# Patient Record
Sex: MALE | Race: OTHER | HISPANIC OR LATINO | Employment: UNEMPLOYED | URBAN - METROPOLITAN AREA
[De-identification: names, ages, dates, MRNs, and addresses within clinical notes are randomized per-mention and may not be internally consistent; named-entity substitution may affect disease eponyms.]

---
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
## 2023-09-29 ENCOUNTER — APPOINTMENT (EMERGENCY)
Dept: RADIOLOGY | Facility: HOSPITAL | Age: 18
End: 2023-09-29

## 2023-09-29 ENCOUNTER — HOSPITAL ENCOUNTER (EMERGENCY)
Facility: HOSPITAL | Age: 18
Discharge: HOME | End: 2023-09-29
Attending: INTERNAL MEDICINE

## 2023-09-29 VITALS
SYSTOLIC BLOOD PRESSURE: 154 MMHG | TEMPERATURE: 98.6 F | WEIGHT: 166.45 LBS | HEART RATE: 75 BPM | DIASTOLIC BLOOD PRESSURE: 83 MMHG | RESPIRATION RATE: 16 BRPM | OXYGEN SATURATION: 100 %

## 2023-09-29 DIAGNOSIS — M25.532 LEFT WRIST PAIN: Primary | ICD-10-CM

## 2023-09-29 PROCEDURE — 99284 EMERGENCY DEPT VISIT MOD MDM: CPT | Performed by: INTERNAL MEDICINE

## 2023-09-29 PROCEDURE — 99283 EMERGENCY DEPT VISIT LOW MDM: CPT

## 2023-09-29 PROCEDURE — 73110 X-RAY EXAM OF WRIST: CPT

## 2023-09-29 RX ORDER — IBUPROFEN 400 MG/1
600 TABLET ORAL EVERY 6 HOURS PRN
Qty: 30 TABLET | Refills: 0 | Status: SHIPPED | OUTPATIENT
Start: 2023-09-29

## 2023-09-29 RX ORDER — IBUPROFEN 600 MG/1
600 TABLET ORAL ONCE
Status: COMPLETED | OUTPATIENT
Start: 2023-09-29 | End: 2023-09-29

## 2023-09-29 RX ORDER — ACETAMINOPHEN 325 MG/1
975 TABLET ORAL ONCE
Status: COMPLETED | OUTPATIENT
Start: 2023-09-29 | End: 2023-09-29

## 2023-09-29 RX ORDER — IBUPROFEN 400 MG/1
600 TABLET ORAL EVERY 6 HOURS PRN
Qty: 30 TABLET | Refills: 0 | Status: SHIPPED | OUTPATIENT
Start: 2023-09-29 | End: 2023-09-29 | Stop reason: SDUPTHER

## 2023-09-29 RX ORDER — SENNOSIDES 8.6 MG
650 CAPSULE ORAL EVERY 8 HOURS PRN
Qty: 30 TABLET | Refills: 0 | Status: SHIPPED | OUTPATIENT
Start: 2023-09-29 | End: 2023-09-29 | Stop reason: SDUPTHER

## 2023-09-29 RX ORDER — SENNOSIDES 8.6 MG
650 CAPSULE ORAL EVERY 8 HOURS PRN
Qty: 30 TABLET | Refills: 0 | Status: SHIPPED | OUTPATIENT
Start: 2023-09-29

## 2023-09-29 RX ADMIN — ACETAMINOPHEN 975 MG: 325 TABLET ORAL at 19:34

## 2023-09-29 RX ADMIN — IBUPROFEN 600 MG: 600 TABLET ORAL at 19:34

## 2023-09-29 NOTE — Clinical Note
Maliha Torres was seen and treated in our emergency department on 9/29/2023. Diagnosis:     [facility]  . He may return on this date: If you have any questions or concerns, please don't hesitate to call.       Cristi Harrington MD    ______________________________           _______________          _______________  [facility] ALEXANDRE [facility] Representative                              Date                                Time

## 2023-09-29 NOTE — ED PROVIDER NOTES
History  Chief Complaint   Patient presents with   • Wrist Pain     Pt reports via  that he slipped on his bed, and twisted his left wrist. No meds PTA. HPI  19year old man with no significant past medical history presents to the ED for evaluation of left wrist pain. He reports he slipped on his bed and twisted his left wrist.  He reports left wrist pain since then. He has not tried any medications or therapies. States pain is worse when he moves the left wrist.  States he broke the same wrist when he was 13years old. No other traumas, no loss of consciousness, no head strike, no blood thinners. Denies pain anywhere else. Prior to Admission Medications   Prescriptions Last Dose Informant Patient Reported? Taking?   erythromycin (ILOTYCIN) ophthalmic ointment   No No   Sig: Place a 1/2 inch ribbon of ointment into the lower eyelid. Facility-Administered Medications: None       History reviewed. No pertinent past medical history. History reviewed. No pertinent surgical history. History reviewed. No pertinent family history. I have reviewed and agree with the history as documented. E-Cigarette/Vaping   • E-Cigarette Use Never User      E-Cigarette/Vaping Substances     Social History     Tobacco Use   • Smoking status: Never   • Smokeless tobacco: Never   Vaping Use   • Vaping Use: Never used   Substance Use Topics   • Alcohol use: Yes     Comment: 1, once every 2 months   • Drug use: Never       Review of Systems   All other systems reviewed and are negative. Physical Exam  Physical Exam  Vitals reviewed. HENT:      Head: Normocephalic. Mouth/Throat:      Mouth: Mucous membranes are moist.   Eyes:      Conjunctiva/sclera: Conjunctivae normal.   Cardiovascular:      Rate and Rhythm: Normal rate. Pulses: Normal pulses. Pulmonary:      Effort: Pulmonary effort is normal.   Abdominal:      General: There is no distension.    Musculoskeletal:         General: Normal range of motion. Right forearm: Normal.      Left forearm: Normal.      Right wrist: Normal.      Left wrist: Tenderness present. No swelling, deformity, effusion, lacerations, bony tenderness, snuff box tenderness or crepitus. Normal range of motion. Normal pulse. Right hand: Normal. No bony tenderness. Normal range of motion. Normal strength. Normal capillary refill. Left hand: Normal. No bony tenderness. Normal range of motion. Normal strength. Normal capillary refill. Cervical back: Normal range of motion. Comments: Bilateral median, ulnar and radial nerves intact. Skin:     General: Skin is warm. Neurological:      Mental Status: He is alert. Vital Signs  ED Triage Vitals   Temperature Pulse Respirations Blood Pressure SpO2   09/29/23 1923 09/29/23 1923 09/29/23 1923 09/29/23 1923 09/29/23 1923   98.6 °F (37 °C) 75 16 154/83 100 %      Temp Source Heart Rate Source Patient Position - Orthostatic VS BP Location FiO2 (%)   09/29/23 1923 09/29/23 1923 09/29/23 1923 09/29/23 1923 --   Oral Monitor Sitting Left arm       Pain Score       09/29/23 1934       10 - Worst Possible Pain           Vitals:    09/29/23 1923   BP: 154/83   Pulse: 75   Patient Position - Orthostatic VS: Sitting         Visual Acuity      ED Medications  Medications   ibuprofen (MOTRIN) tablet 600 mg (600 mg Oral Given 9/29/23 1934)   acetaminophen (TYLENOL) tablet 975 mg (975 mg Oral Given 9/29/23 1934)       Diagnostic Studies  Results Reviewed     None                 XR wrist 3+ views LEFT    (Results Pending)              Procedures  Procedures         ED Course         YAZMIN    Flowsheet Row Most Recent Value   YAZMIN Initial Screen: During the past 12 months, did you:    1. Drink any alcohol (more than a few sips)? No Filed at: 09/29/2023 1924   2. Smoke any marijuana or hashish No Filed at: 09/29/2023 1924   3.  Use anything else to get high? ("anything else" includes illegal drugs, over the counter and prescription drugs, and things that you sniff or 'omss')? No Filed at: 09/29/2023 1924                                          Medical Decision Making  We will call you regarding the final x-ray read of left wrist.  On my read, I didsee what appears to be an old fractures, no new fractures or dislocations appreciated by me. Volar splint placed by ER tech. Please keep splint in place until pain resolves and you are called regarding final x-ray reads. Use Tylenol, Motrin and ice for pain relief. Avoid activities and sports that may cause further injury, stress or pain. If pain continues or worsens or fails to improve, follow up with Orthopedics, PCP or in the ER      Amount and/or Complexity of Data Reviewed  Radiology: ordered. Risk  OTC drugs. Prescription drug management. Disposition  Final diagnoses:   Left wrist pain     Time reflects when diagnosis was documented in both MDM as applicable and the Disposition within this note     Time User Action Codes Description Comment    9/29/2023  7:57 PM Mitch Camilo [facility] Left wrist pain       ED Disposition     ED Disposition   Discharge    Condition   Stable    Date/Time   Fri Sep 29, 2023  8:01 PM    Comment   Lmaont Jhaveri discharge to home/self care.                Follow-up Information    None         Patient's Medications   Discharge Prescriptions    ACETAMINOPHEN (TYLENOL) 650 MG CR TABLET    Take 1 tablet (650 mg total) by mouth every 8 (eight) hours as needed for mild pain       Start Date: 9/29/2023 End Date: --       Order Dose: 650 mg       Quantity: 30 tablet    Refills: 0    IBUPROFEN (MOTRIN) 400 MG TABLET    Take 1.5 tablets (600 mg total) by mouth every 6 (six) hours as needed for mild pain       Start Date: 9/29/2023 End Date: --       Order Dose: 600 mg       Quantity: 30 tablet    Refills: 0           PDMP Review     None          ED Provider  Electronically Signed by Wanda Weiss MD  09/29/23 203

## 2023-09-29 NOTE — Clinical Note
Tex Paris was seen and treated in our emergency department on 9/29/2023. Diagnosis:     [facility]  . He may return on this date: If you have any questions or concerns, please don't hesitate to call.       Jack Hernandez MD    ______________________________           _______________          _______________  [facility] Representative                              Date                                Time

## 2023-10-02 ENCOUNTER — TELEPHONE (OUTPATIENT)
Age: 18
End: 2023-10-02

## 2023-10-02 NOTE — TELEPHONE ENCOUNTER
Patient is being referred to a orthopedics. Please schedule accordingly.    [facility] [place]   [phone]

## 2023-10-04 ENCOUNTER — OFFICE VISIT (OUTPATIENT)
Dept: OBGYN CLINIC | Facility: CLINIC | Age: 18
End: 2023-10-04

## 2023-10-04 VITALS — DIASTOLIC BLOOD PRESSURE: 59 MMHG | SYSTOLIC BLOOD PRESSURE: 120 MMHG | WEIGHT: 166 LBS

## 2023-10-04 DIAGNOSIS — S63.502A SPRAIN OF LEFT WRIST, INITIAL ENCOUNTER: Primary | ICD-10-CM

## 2023-10-04 RX ORDER — MELOXICAM 15 MG/1
15 TABLET ORAL DAILY
Qty: 30 TABLET | Refills: 0 | Status: SHIPPED | OUTPATIENT
Start: 2023-10-04

## 2023-10-04 NOTE — PROGRESS NOTES
Patient Name:  Norma River  MRN:  [id number]    Assessment & Plan     Left wrist sprain after fall out of bed 9/29/2023.  1. Radiographs reviewed today reveal no evidence of fracture. Patient is nontender over the scaphoid at this time. He does exhibit tenderness over the wrist joint. 2. Patient was placed in a universal wrist brace for comfort, wean as tolerated as pain improves. 3. Refer to Occupational Therapy. 4. Prescription for meloxicam.  5. Follow-up in 6 weeks with primary care sports medicine.  utilized during encounter today. Chief Complaint     Left wrist injury     History of the Present Illness     Norma River is a 19 y.o. right-hand-dominant male who reports to the office today for evaluation of his left wrist.  Patient sustained a fall out of bed when he was reaching out to plug in his phone on 9/29/2023. He states he fell and struck his left wrist causing a flexion injury. After this occurred he noted progressively worsening pain which prompted him to report to the emergency department. At that time x-rays were performed and he was placed in a short arm splint. He reports to the office today noting some persistent pain primarily in the dorsum and ulnar aspect of the wrist. Pain is worse with repetitive use and grasping objects. He notes weakness due to the pain. He does note some associated swelling. He denies any instability. He denies any numbness and tingling. He currently takes nothing for pain. Patient states he did remove the splint that was applied by the emergency department. He does report a wrist injury to the left wrist as a child which was also treated conservatively. Physical Exam     /59   Wt 75.3 kg (166 lb)     Left wrist: Skin intact. No gross deformity. No erythema ecchymosis or swelling. No tenderness over the distal radius and scaphoid.   There is some tenderness over the distal ulna and ECU tendon as well as the dorsum of the wrist joint. Range of motion of the wrist is intact and full with slight discomfort. Full composite fist  without severe pain. Negative DRUJ shuck test.  Sensation intact median ulnar and radial nerves. 2+ radial pulse. Eyes: Anicteric sclerae. ENT: Trachea midline. Lungs: Normal respiratory effort. CV: Capillary refill is less than 2 seconds. Skin: Intact without erythema. Lymph: No palpable lymphadenopathy. Neuro: Sensation is grossly intact to light touch. Psych: Mood and affect are appropriate. Data Review     I have personally reviewed pertinent films in PACS, and my interpretation follows:    X-rays left wrist 9/29/2023: No acute osseous abnormality. No fracture or dislocation. No significant degenerative changes. Ulnar styloid ossicle evident likely from prior injury. History reviewed. No pertinent past medical history. History reviewed. No pertinent surgical history. No Known Allergies    Current Outpatient Medications on File Prior to Visit   Medication Sig Dispense Refill   • acetaminophen (TYLENOL) 650 mg CR tablet Take 1 tablet (650 mg total) by mouth every 8 (eight) hours as needed for mild pain (Patient not taking: Reported on 10/4/2023) 30 tablet 0   • erythromycin (ILOTYCIN) ophthalmic ointment Place a 1/2 inch ribbon of ointment into the lower eyelid. (Patient not taking: Reported on 10/4/2023) 3.5 g 0   • ibuprofen (MOTRIN) 400 mg tablet Take 1.5 tablets (600 mg total) by mouth every 6 (six) hours as needed for mild pain (Patient not taking: Reported on 10/4/2023) 30 tablet 0     No current facility-administered medications on file prior to visit. Social History     Tobacco Use   • Smoking status: Never   • Smokeless tobacco: Never   Vaping Use   • Vaping Use: Never used   Substance Use Topics   • Alcohol use: Yes     Comment: 1, once every 2 months   • Drug use: Never       History reviewed.  No pertinent family history. Review of Systems     As stated in the HPI. All other systems reviewed and are negative.

## 2024-11-25 ENCOUNTER — HOSPITAL ENCOUNTER (EMERGENCY)
Facility: HOSPITAL | Age: 19
Discharge: HOME | End: 2024-11-25
Attending: EMERGENCY MEDICINE

## 2024-11-25 ENCOUNTER — APPOINTMENT (EMERGENCY)
Dept: RADIOLOGY | Facility: HOSPITAL | Age: 19
End: 2024-11-25

## 2024-11-25 VITALS
TEMPERATURE: 98.1 F | DIASTOLIC BLOOD PRESSURE: 85 MMHG | SYSTOLIC BLOOD PRESSURE: 142 MMHG | OXYGEN SATURATION: 97 % | HEART RATE: 73 BPM | RESPIRATION RATE: 18 BRPM | WEIGHT: 162.48 LBS

## 2024-11-25 DIAGNOSIS — S63.259A FINGER DISLOCATION, INITIAL ENCOUNTER: ICD-10-CM

## 2024-11-25 DIAGNOSIS — S60.00XA FINGER CONTUSION: ICD-10-CM

## 2024-11-25 PROCEDURE — 73130 X-RAY EXAM OF HAND: CPT

## 2024-11-25 PROCEDURE — 99284 EMERGENCY DEPT VISIT MOD MDM: CPT | Performed by: EMERGENCY MEDICINE

## 2024-11-25 PROCEDURE — 99283 EMERGENCY DEPT VISIT LOW MDM: CPT

## 2024-11-25 PROCEDURE — 26770 TREAT FINGER DISLOCATION: CPT | Performed by: PHYSICIAN ASSISTANT

## 2024-11-25 RX ORDER — IBUPROFEN 600 MG/1
600 TABLET, FILM COATED ORAL EVERY 6 HOURS PRN
Qty: 30 TABLET | Refills: 0 | Status: SHIPPED | OUTPATIENT
Start: 2024-11-25

## 2024-11-25 RX ORDER — ACETAMINOPHEN 500 MG
500 TABLET ORAL EVERY 6 HOURS PRN
Qty: 30 TABLET | Refills: 0 | Status: SHIPPED | OUTPATIENT
Start: 2024-11-25

## 2024-11-25 NOTE — ED PROVIDER NOTES
Time reflects when diagnosis was documented in both MDM as applicable and the Disposition within this note       Time User Action Codes Description Comment    11/25/2024  1:02 AM Eileen Gonsalez Add [S60.00XA] Finger contusion     11/25/2024  1:02 AM Eileen Gonsalez Add [S63.259A] Finger dislocation, initial encounter     11/25/2024  1:04 AM Eileen Gonsalez Modify [S60.00XA] Finger contusion     11/25/2024  1:04 AM Eileen Gonsalez Modify [S63.259A] Finger dislocation, initial encounter           ED Disposition       ED Disposition   Discharge    Condition   Stable    Date/Time   Mon Nov 25, 2024  1:02 AM    Comment   Harshal Jhaveri discharge to home/self care.                   Assessment & Plan       Medical Decision Making  Patient presented complaining of right fifth digit pain after punching a door.  No obvious osseous abnormality was seen on imaging however patient was unable to complete full range of motion of the finger.  Reduction with traction was attempted by myself in the room and a audible pop was appreciated.  After this patient was able to range his finger without difficulty and states that it feels better.  Splint was applied by tech and checked by myself.  Patient was neurovascular intact following splint placement.  Patient was given follow-up with orthopedic hand surgery and educated on supportive care and return precautions.  Discharged home.    Amount and/or Complexity of Data Reviewed  Radiology: ordered.    Risk  OTC drugs.  Prescription drug management.             Medications - No data to display    ED Risk Strat Scores                                               History of Present Illness       Chief Complaint   Patient presents with    Finger Injury     Pt reports he punched a door before coming in tonight and hurt his right pinky finger. States he think its broken. No med PTA.        History reviewed. No pertinent past medical history.   History reviewed. No  pertinent surgical history.   History reviewed. No pertinent family history.   Social History     Tobacco Use    Smoking status: Never    Smokeless tobacco: Never   Vaping Use    Vaping status: Every Day    Substances: Nicotine   Substance Use Topics    Alcohol use: Yes     Comment: 1, once every 2 months    Drug use: Never      E-Cigarette/Vaping    E-Cigarette Use Current Every Day User       E-Cigarette/Vaping Substances    Nicotine Yes       I have reviewed and agree with the history as documented.     19-year-old right-handed male without significant past medical history presents complaining of right pinky pain after punching a door.  Patient states he became frustrated and punched a door and that his pinky now feels out of place or broken.  Admits to history of similar and states he broke his fifth metacarpal several years ago also from punching a wall.  Has not taken medications prior to arrival.  Denies any other injury sustained.  Denies any other complaints.      History provided by:  Patient   used: No        Review of Systems   Constitutional: Negative.  Negative for chills and fatigue.   HENT:  Negative for ear pain and sore throat.    Eyes:  Negative for photophobia and redness.   Respiratory:  Negative for apnea, cough and shortness of breath.    Cardiovascular:  Negative for chest pain.   Gastrointestinal:  Negative for abdominal pain, nausea and vomiting.   Genitourinary:  Negative for dysuria.   Musculoskeletal:  Positive for arthralgias. Negative for neck pain and neck stiffness.        R 5th digit pain    Skin:  Negative for rash.   Neurological:  Negative for dizziness, tremors, syncope and weakness.   Psychiatric/Behavioral:  Negative for suicidal ideas.            Objective       ED Triage Vitals [11/25/24 0029]   Temperature Pulse Blood Pressure Respirations SpO2 Patient Position - Orthostatic VS   98.1 °F (36.7 °C) 73 142/85 18 97 % Lying      Temp Source Heart Rate  "Source BP Location FiO2 (%) Pain Score    Oral Monitor Left arm -- --      Vitals      Date and Time Temp Pulse SpO2 Resp BP Pain Score FACES Pain Rating User   11/25/24 0029 98.1 °F (36.7 °C) 73 97 % 18 142/85 -- -- MN            Physical Exam  Constitutional:       General: He is not in acute distress.     Appearance: He is well-developed. He is not diaphoretic.   Eyes:      Pupils: Pupils are equal, round, and reactive to light.   Cardiovascular:      Rate and Rhythm: Normal rate and regular rhythm.   Pulmonary:      Effort: Pulmonary effort is normal. No respiratory distress.      Breath sounds: Normal breath sounds.   Abdominal:      General: Bowel sounds are normal. There is no distension.      Palpations: Abdomen is soft.   Musculoskeletal:         General: Normal range of motion.      Cervical back: Normal range of motion and neck supple.      Comments: No obvious deformity however patient holds right fifth digit in flexion with some swelling noted at the PIP.  Tenderness appreciated at the PIP.  Cap refill less than 2 seconds.  Sensation intact distally.   Skin:     General: Skin is warm and dry.   Neurological:      Mental Status: He is alert and oriented to person, place, and time.         Results Reviewed       None            XR hand 3+ views RIGHT    (Results Pending)       Orthopedic injury treatment    Date/Time: 11/25/2024 2:35 AM    Performed by: Eileen Gonsalez PA-C  Authorized by: Eileen Gonsalez PA-C    Patient Location:  ED  [place] Protocol:  Procedure performed by: ([place] pct)  Consent: Verbal consent obtained.  Risks and benefits: risks, benefits and alternatives were discussed  Consent given by: patient  Time out: Immediately prior to procedure a \"time out\" was called to verify the correct patient, procedure, equipment, support staff and site/side marked as required.  Patient understanding: patient states understanding of the procedure being performed  Patient consent: the " patient's understanding of the procedure matches consent given  Procedure consent: procedure consent matches procedure scheduled  Relevant documents: relevant documents present and verified  Test results: test results available and properly labeled  Site marked: the operative site was marked  Radiology Images displayed and confirmed. If images not available, report reviewed: imaging studies available  Required items: required blood products, implants, devices, and special equipment available  Patient identity confirmed: verbally with patient    Injury location:  Finger  Location details:  Right little finger  Injury type:  Dislocation  Dislocation type: PIP    Neurovascular status: Neurovascularly intact    Distal perfusion: normal    Neurological function: normal    Range of motion: normal    Local anesthesia used?: No    Manipulation performed?: Yes    Skeletal traction used?: No    Reduction successful?: Yes    Immobilization:  Splint  Splint type:  Finger splint, static  Supplies used:  Aluminum splint  Neurovascular status: Neurovascularly intact    Distal perfusion: normal    Neurological function: normal    Range of motion: normal    Patient tolerance:  Patient tolerated the procedure well with no immediate complications      ED Medication and Procedure Management   Prior to Admission Medications   Prescriptions Last Dose Informant Patient Reported? Taking?   acetaminophen (TYLENOL) 650 mg CR tablet   No No   Sig: Take 1 tablet (650 mg total) by mouth every 8 (eight) hours as needed for mild pain   Patient not taking: Reported on 10/4/2023   erythromycin (ILOTYCIN) ophthalmic ointment   No No   Sig: Place a 1/2 inch ribbon of ointment into the lower eyelid.   Patient not taking: Reported on 10/4/2023   ibuprofen (MOTRIN) 400 mg tablet   No No   Sig: Take 1.5 tablets (600 mg total) by mouth every 6 (six) hours as needed for mild pain   Patient not taking: Reported on 10/4/2023   meloxicam (Mobic) 15 mg tablet    No No   Sig: Take 1 tablet (15 mg total) by mouth daily      Facility-Administered Medications: None     Discharge Medication List as of 11/25/2024  1:04 AM        START taking these medications    Details   acetaminophen (TYLENOL) 500 mg tablet Take 1 tablet (500 mg total) by mouth every 6 (six) hours as needed for moderate pain, Starting Mon 11/25/2024, Normal      !! ibuprofen (MOTRIN) 600 mg tablet Take 1 tablet (600 mg total) by mouth every 6 (six) hours as needed for moderate pain, Starting Mon 11/25/2024, Normal       !! - Potential duplicate medications found. Please discuss with provider.        CONTINUE these medications which have NOT CHANGED    Details   acetaminophen (TYLENOL) 650 mg CR tablet Take 1 tablet (650 mg total) by mouth every 8 (eight) hours as needed for mild pain, Starting Fri 9/29/2023, Normal      erythromycin (ILOTYCIN) ophthalmic ointment Place a 1/2 inch ribbon of ointment into the lower eyelid., Print      !! ibuprofen (MOTRIN) 400 mg tablet Take 1.5 tablets (600 mg total) by mouth every 6 (six) hours as needed for mild pain, Starting Fri 9/29/2023, Normal      meloxicam (Mobic) 15 mg tablet Take 1 tablet (15 mg total) by mouth daily, Starting Wed 10/4/2023, Normal       !! - Potential duplicate medications found. Please discuss with provider.        No discharge procedures on file.  ED SEPSIS DOCUMENTATION   Time reflects when diagnosis was documented in both MDM as applicable and the Disposition within this note       Time User Action Codes Description Comment    11/25/2024  1:02 AM Eileen Gonsalez Add [S60.00XA] Finger contusion     11/25/2024  1:02 AM Eileen Gonsalez Add [S63.259A] Finger dislocation, initial encounter     11/25/2024  1:04 AM Eileen Gonsalez Modify [S60.00XA] Finger contusion     11/25/2024  1:04 AM Eileen Gonsalez Modify [S63.259A] Finger dislocation, initial encounter                  Eileen Gonsalez PA-C  11/25/24 0615

## 2024-11-25 NOTE — DISCHARGE INSTRUCTIONS
Use Motrin and Tylenol for pain.  Follow-up with hand surgery.  Call tomorrow to make an appointment.  Return for new or worsening complaints.

## 2024-11-25 NOTE — Clinical Note
Harshal Jhaveri was seen and treated in our emergency department on 11/25/2024.    No restrictions            Diagnosis:     Harshal  may return to work on return date.    He may return on this date:          If you have any questions or concerns, please don't hesitate to call.      Eileen Gonsalez PA-C    ______________________________           _______________          _______________  Hospital Representative                              Date                                Time

## 2025-02-19 ENCOUNTER — APPOINTMENT (EMERGENCY)
Dept: RADIOLOGY | Facility: HOSPITAL | Age: 20
End: 2025-02-19

## 2025-02-19 ENCOUNTER — HOSPITAL ENCOUNTER (EMERGENCY)
Facility: HOSPITAL | Age: 20
Discharge: HOME | End: 2025-02-19
Attending: EMERGENCY MEDICINE

## 2025-02-19 VITALS
OXYGEN SATURATION: 99 % | TEMPERATURE: 98.9 F | WEIGHT: 175.49 LBS | RESPIRATION RATE: 22 BRPM | DIASTOLIC BLOOD PRESSURE: 91 MMHG | SYSTOLIC BLOOD PRESSURE: 140 MMHG | HEART RATE: 108 BPM

## 2025-02-19 DIAGNOSIS — J11.1 INFLUENZA: Primary | ICD-10-CM

## 2025-02-19 DIAGNOSIS — R11.2 NAUSEA & VOMITING: ICD-10-CM

## 2025-02-19 LAB
ALBUMIN SERPL BCG-MCNC: 4.8 G/DL (ref 3.5–5)
ALP SERPL-CCNC: 62 U/L (ref 34–104)
ALT SERPL W P-5'-P-CCNC: 25 U/L (ref 7–52)
ANION GAP SERPL CALCULATED.3IONS-SCNC: 11 MMOL/L (ref 4–13)
AST SERPL W P-5'-P-CCNC: 24 U/L (ref 13–39)
ATRIAL RATE: 103 BPM
BASOPHILS # BLD AUTO: 0.03 THOUSANDS/ΜL (ref 0–0.1)
BASOPHILS NFR BLD AUTO: 1 % (ref 0–1)
BILIRUB SERPL-MCNC: 0.84 MG/DL (ref 0.2–1)
BUN SERPL-MCNC: 15 MG/DL (ref 5–25)
CALCIUM SERPL-MCNC: 9.7 MG/DL (ref 8.4–10.2)
CHLORIDE SERPL-SCNC: 102 MMOL/L (ref 96–108)
CO2 SERPL-SCNC: 26 MMOL/L (ref 21–32)
CREAT SERPL-MCNC: 1.26 MG/DL (ref 0.6–1.3)
EOSINOPHIL # BLD AUTO: 0.1 THOUSAND/ΜL (ref 0–0.61)
EOSINOPHIL NFR BLD AUTO: 2 % (ref 0–6)
ERYTHROCYTE [DISTWIDTH] IN BLOOD BY AUTOMATED COUNT: 11.9 % (ref 11.6–15.1)
FLUAV AG UPPER RESP QL IA.RAPID: POSITIVE
FLUBV AG UPPER RESP QL IA.RAPID: NEGATIVE
GFR SERPL CREATININE-BSD FRML MDRD: 80 ML/MIN/1.73SQ M
GLUCOSE SERPL-MCNC: 98 MG/DL (ref 65–140)
HCT VFR BLD AUTO: 43 % (ref 36.5–49.3)
HGB BLD-MCNC: 14.9 G/DL (ref 12–17)
IMM GRANULOCYTES # BLD AUTO: 0.02 THOUSAND/UL (ref 0–0.2)
IMM GRANULOCYTES NFR BLD AUTO: 0 % (ref 0–2)
LIPASE SERPL-CCNC: 75 U/L (ref 11–82)
LYMPHOCYTES # BLD AUTO: 1.58 THOUSANDS/ΜL (ref 0.6–4.47)
LYMPHOCYTES NFR BLD AUTO: 29 % (ref 14–44)
MCH RBC QN AUTO: 28.9 PG (ref 26.8–34.3)
MCHC RBC AUTO-ENTMCNC: 34.7 G/DL (ref 31.4–37.4)
MCV RBC AUTO: 84 FL (ref 82–98)
MONOCYTES # BLD AUTO: 0.62 THOUSAND/ΜL (ref 0.17–1.22)
MONOCYTES NFR BLD AUTO: 11 % (ref 4–12)
NEUTROPHILS # BLD AUTO: 3.18 THOUSANDS/ΜL (ref 1.85–7.62)
NEUTS SEG NFR BLD AUTO: 57 % (ref 43–75)
NRBC BLD AUTO-RTO: 0 /100 WBCS
P AXIS: 80 DEGREES
PLATELET # BLD AUTO: 238 THOUSANDS/UL (ref 149–390)
PMV BLD AUTO: 11.1 FL (ref 8.9–12.7)
POTASSIUM SERPL-SCNC: 3.4 MMOL/L (ref 3.5–5.3)
PR INTERVAL: 158 MS
PROT SERPL-MCNC: 8.1 G/DL (ref 6.4–8.4)
QRS AXIS: 92 DEGREES
QRSD INTERVAL: 84 MS
QT INTERVAL: 304 MS
QTC INTERVAL: 398 MS
RBC # BLD AUTO: 5.15 MILLION/UL (ref 3.88–5.62)
SARS-COV+SARS-COV-2 AG RESP QL IA.RAPID: NEGATIVE
SODIUM SERPL-SCNC: 139 MMOL/L (ref 135–147)
T WAVE AXIS: 9 DEGREES
VENTRICULAR RATE: 103 BPM
WBC # BLD AUTO: 5.53 THOUSAND/UL (ref 4.31–10.16)

## 2025-02-19 PROCEDURE — 87811 SARS-COV-2 COVID19 W/OPTIC: CPT | Performed by: PHYSICIAN ASSISTANT

## 2025-02-19 PROCEDURE — 71045 X-RAY EXAM CHEST 1 VIEW: CPT

## 2025-02-19 PROCEDURE — 99285 EMERGENCY DEPT VISIT HI MDM: CPT | Performed by: PHYSICIAN ASSISTANT

## 2025-02-19 PROCEDURE — 93005 ELECTROCARDIOGRAM TRACING: CPT

## 2025-02-19 PROCEDURE — 96374 THER/PROPH/DIAG INJ IV PUSH: CPT

## 2025-02-19 PROCEDURE — 96375 TX/PRO/DX INJ NEW DRUG ADDON: CPT

## 2025-02-19 PROCEDURE — 87804 INFLUENZA ASSAY W/OPTIC: CPT | Performed by: PHYSICIAN ASSISTANT

## 2025-02-19 PROCEDURE — 99284 EMERGENCY DEPT VISIT MOD MDM: CPT

## 2025-02-19 PROCEDURE — 36415 COLL VENOUS BLD VENIPUNCTURE: CPT | Performed by: PHYSICIAN ASSISTANT

## 2025-02-19 PROCEDURE — 85025 COMPLETE CBC W/AUTO DIFF WBC: CPT | Performed by: PHYSICIAN ASSISTANT

## 2025-02-19 PROCEDURE — 80053 COMPREHEN METABOLIC PANEL: CPT | Performed by: PHYSICIAN ASSISTANT

## 2025-02-19 PROCEDURE — 83690 ASSAY OF LIPASE: CPT | Performed by: PHYSICIAN ASSISTANT

## 2025-02-19 PROCEDURE — 93010 ELECTROCARDIOGRAM REPORT: CPT | Performed by: INTERNAL MEDICINE

## 2025-02-19 RX ORDER — ONDANSETRON 4 MG/1
4 TABLET, ORALLY DISINTEGRATING ORAL EVERY 6 HOURS PRN
Qty: 20 TABLET | Refills: 0 | Status: SHIPPED | OUTPATIENT
Start: 2025-02-19

## 2025-02-19 RX ORDER — ONDANSETRON 4 MG/1
4 TABLET, ORALLY DISINTEGRATING ORAL ONCE
Status: DISCONTINUED | OUTPATIENT
Start: 2025-02-19 | End: 2025-02-19

## 2025-02-19 RX ORDER — FAMOTIDINE 10 MG/ML
20 INJECTION, SOLUTION INTRAVENOUS ONCE
Status: COMPLETED | OUTPATIENT
Start: 2025-02-19 | End: 2025-02-19

## 2025-02-19 RX ORDER — ONDANSETRON 2 MG/ML
4 INJECTION INTRAMUSCULAR; INTRAVENOUS ONCE
Status: COMPLETED | OUTPATIENT
Start: 2025-02-19 | End: 2025-02-19

## 2025-02-19 RX ADMIN — ONDANSETRON 4 MG: 2 INJECTION INTRAMUSCULAR; INTRAVENOUS at 03:17

## 2025-02-19 RX ADMIN — FAMOTIDINE 20 MG: 10 INJECTION, SOLUTION INTRAVENOUS at 03:17

## 2025-02-19 NOTE — ED PROVIDER NOTES
Time reflects when diagnosis was documented in both MDM as applicable and the Disposition within this note       Time User Action Codes Description Comment    2/19/2025  4:07 AM Carissa Hill [J11.1] Influenza     2/19/2025  4:08 AM Carissa Hill [R11.2] Nausea & vomiting           ED Disposition       ED Disposition   Discharge    Condition   Stable    Date/Time   Wed Feb 19, 2025  4:07 AM    Comment   Harshal Jhaveri discharge to home/self care.             Assessment & Plan       Medical Decision Making      DDx including but not limited to: viral illness, pneumonia, URI, pharyngitis, influenza, COVID-19, gastroenteritis, food poisoning    The patient is stable and has a history and physical exam consistent with a viral illness. COVID/Flu testing has been performed.  Given multiple episodes of vomiting while in the ED, will order IV Zofran, IV Pepcid, CBC for evaluation of anemia and leukocytosis, lipase for evaluation of pancreatitis, CMP for evaluation of LFTs, kidney function electrolyte abnormalities.  Will also order an EKG due to tachycardia and CXR for shortness of breath.  I considered the patient's other medical conditions as applicable/noted above in my medical decision making.  The patient improved upon discharge. The plan is for supportive care at home.  Symptomatic therapy suggested: rest, increase fluids, gargle prn for sore throat, OTC acetaminophen, ibuprofen, cough suppressant of choice, bland diet, and call prn if symptoms persist or worsen. Call or go to PCP if these symptoms persist or fail to improve as anticipated. Return to ER precautions discussed as well.    Prior to discharge, discharge instructions were discussed with patient at bedside. Patient was provided both verbal and written instructions. Patient is understanding of the discharge instructions and is agreeable to plan of care. Return precautions were discussed with patient bedside, patient verbalized  understanding of signs and symptoms that would necessitate return to the ED. All questions were answered. Patient was comfortable with the plan of care and discharged to home.     Dispo: discharge home with follow up to PCP. Patient stable, in no acute distress and non-toxic at discharge.    Problems Addressed:  Influenza: acute illness or injury  Nausea & vomiting: acute illness or injury    Amount and/or Complexity of Data Reviewed  Labs: ordered. Decision-making details documented in ED Course.  Radiology: ordered and independent interpretation performed.    Risk  Prescription drug management.        ED Course as of 02/19/25 0418 Wed Feb 19, 2025   0406 Influenza A Rapid Antigen(!): Positive       Medications   ondansetron (ZOFRAN) injection 4 mg (4 mg Intravenous Given 2/19/25 0317)   Famotidine (PF) (PEPCID) injection 20 mg (20 mg Intravenous Given 2/19/25 0317)       ED Risk Strat Scores                                                History of Present Illness       Chief Complaint   Patient presents with    Flu Symptoms     Pt reports sore throat, fevers, shaking, nausea and vomiting that began yesterday. Pt reports he woke up short of breath with palpitations. Pt shaking in triage        History reviewed. No pertinent past medical history.   History reviewed. No pertinent surgical history.   History reviewed. No pertinent family history.   Social History     Tobacco Use    Smoking status: Never    Smokeless tobacco: Never   Vaping Use    Vaping status: Every Day    Substances: Nicotine   Substance Use Topics    Alcohol use: Yes     Comment: 1, once every 2 months    Drug use: Never      E-Cigarette/Vaping    E-Cigarette Use Current Every Day User       E-Cigarette/Vaping Substances    Nicotine Yes       I have reviewed and agree with the history as documented.     Harshal Gallowayreynaldopaulina is a 20 y.o. male who presents with flulike symptoms.  Patient reports starting with sore throat, fevers, shaking  and nausea yesterday.  He woke up this evening with palpitations and feeling short of breath.  He started vomiting upon arrival to the ED this evening.  He took medication for his sore throat and fever earlier in the day.      History provided by:  Patient   used: Yes (OG Mcdonald)        Review of Systems   Constitutional:  Positive for chills and fever.   HENT:  Positive for congestion.    Respiratory:  Positive for cough and shortness of breath.    Cardiovascular:  Positive for palpitations.   Gastrointestinal:  Positive for abdominal pain, nausea and vomiting.   Musculoskeletal:  Positive for myalgias.   All other systems reviewed and are negative.          Objective       ED Triage Vitals [02/19/25 0301]   Temperature Pulse Blood Pressure Respirations SpO2 Patient Position - Orthostatic VS   98.9 °F (37.2 °C) (!) 108 140/91 22 99 % Sitting      Temp Source Heart Rate Source BP Location FiO2 (%) Pain Score    Oral Monitor Right arm -- --      Vitals      Date and Time Temp Pulse SpO2 Resp BP Pain Score FACES Pain Rating User   02/19/25 0301 98.9 °F (37.2 °C) 108 99 % 22 140/91 -- -- AJ            Physical Exam  Vitals reviewed.   Constitutional:       General: He is not in acute distress.     Appearance: Normal appearance. He is well-developed and well-groomed. He is not ill-appearing, toxic-appearing or diaphoretic.   HENT:      Head: Normocephalic and atraumatic.      Right Ear: External ear normal.      Left Ear: External ear normal.      Nose: Nose normal. No congestion or rhinorrhea.      Mouth/Throat:      Mouth: Mucous membranes are moist.      Pharynx: Oropharynx is clear. No oropharyngeal exudate or posterior oropharyngeal erythema.   Eyes:      General: No scleral icterus.        Right eye: No discharge.         Left eye: No discharge.      Extraocular Movements: Extraocular movements intact.      Conjunctiva/sclera: Conjunctivae normal.   Cardiovascular:      Rate and Rhythm:  Regular rhythm. Tachycardia present.      Pulses: Normal pulses.      Heart sounds: No murmur heard.     No friction rub. No gallop.   Pulmonary:      Effort: Pulmonary effort is normal. No tachypnea or respiratory distress.      Breath sounds: Normal breath sounds. No wheezing, rhonchi or rales.      Comments: No respiratory distress, no wheezes rales or rhonchi.  No conversational dyspnea.  No tachypnea or increased work of breathing.  Chest:      Chest wall: No tenderness.   Abdominal:      General: Abdomen is flat. There is no distension.      Palpations: Abdomen is soft.      Tenderness: There is no abdominal tenderness. There is no right CVA tenderness, left CVA tenderness, guarding or rebound.      Comments: Actively vomiting on exam   Musculoskeletal:         General: No deformity. Normal range of motion.      Cervical back: Normal range of motion and neck supple.   Skin:     General: Skin is warm and dry.      Coloration: Skin is not jaundiced or pale.      Findings: No rash.   Neurological:      General: No focal deficit present.      Mental Status: He is alert.   Psychiatric:         Mood and Affect: Mood normal.         Behavior: Behavior normal. Behavior is cooperative.         Results Reviewed       Procedure Component Value Units Date/Time    FLU/COVID Rapid Antigen (30 min. TAT) - Preferred screening test in ED [[id number]]  (Abnormal) Collected: 02/19/25 0312    Lab Status: Final result Specimen: Nares from Nose Updated: 02/19/25 0340     SARS COV Rapid Antigen Negative     Influenza A Rapid Antigen Positive     Influenza B Rapid Antigen Negative    Narrative:      This test has been performed using the [employer] Cee 2 FLU+SARS Antigen test under the Emergency Use Authorization (EUA). This test has been validated by the  and verified by the performing laboratory. The Cee uses lateral flow immunofluorescent sandwich assay to detect SARS-COV, Influenza A and Influenza B Antigen.     The  Quidel Cee 2 SARS Antigen test does not differentiate between SARS-CoV and SARS-CoV-2.     Negative results are presumptive and may be confirmed with a molecular assay, if necessary, for patient management. Negative results do not rule out SARS-CoV-2 or influenza infection and should not be used as the sole basis for treatment or patient management decisions. A negative test result may occur if the level of antigen in a sample is below the limit of detection of this test.     Positive results are indicative of the presence of viral antigens, but do not rule out bacterial infection or co-infection with other viruses.     All test results should be used as an adjunct to clinical observations and other information available to the provider.    FOR PEDIATRIC PATIENTS - copy/paste COVID Guidelines URL to browser: [email or web address]    Comprehensive metabolic panel [[id number]]  (Abnormal) Collected: 02/19/25 0312    Lab Status: Final result Specimen: Blood from Arm, Right Updated: 02/19/25 0339     Sodium 139 mmol/L      Potassium 3.4 mmol/L      Chloride 102 mmol/L      CO2 26 mmol/L      ANION GAP 11 mmol/L      BUN 15 mg/dL      Creatinine 1.26 mg/dL      Glucose 98 mg/dL      Calcium 9.7 mg/dL      AST 24 U/L      ALT 25 U/L      Alkaline Phosphatase 62 U/L      Total Protein 8.1 g/dL      Albumin 4.8 g/dL      Total Bilirubin 0.84 mg/dL      eGFR 80 ml/min/1.73sq m     Narrative:      National Kidney Disease Foundation guidelines for Chronic Kidney Disease (CKD):     Stage 1 with normal or high GFR (GFR > 90 mL/min/1.73 square meters)    Stage 2 Mild CKD (GFR = 60-89 mL/min/1.73 square meters)    Stage 3A Moderate CKD (GFR = 45-59 mL/min/1.73 square meters)    Stage 3B Moderate CKD (GFR = 30-44 mL/min/1.73 square meters)    Stage 4 Severe CKD (GFR = 15-29 mL/min/1.73 square meters)    Stage 5 End Stage CKD (GFR <15 mL/min/1.73 square meters)  Note: GFR  calculation is accurate only with a steady state creatinine    Lipase [[id number]]  (Normal) Collected: 02/19/25 0312    Lab Status: Final result Specimen: Blood from Arm, Right Updated: 02/19/25 0339     Lipase 75 u/L     CBC and differential [[id number]] Collected: 02/19/25 0312    Lab Status: Final result Specimen: Blood from Arm, Right Updated: 02/19/25 0325     WBC 5.53 Thousand/uL      RBC 5.15 Million/uL      Hemoglobin 14.9 g/dL      Hematocrit 43.0 %      MCV 84 fL      MCH 28.9 pg      MCHC 34.7 g/dL      RDW 11.9 %      MPV 11.1 fL      Platelets 238 Thousands/uL      nRBC 0 /100 WBCs      Segmented % 57 %      Immature Grans % 0 %      Lymphocytes % 29 %      Monocytes % 11 %      Eosinophils Relative 2 %      Basophils Relative 1 %      Absolute Neutrophils 3.18 Thousands/µL      Absolute Immature Grans 0.02 Thousand/uL      Absolute Lymphocytes 1.58 Thousands/µL      Absolute Monocytes 0.62 Thousand/µL      Eosinophils Absolute 0.10 Thousand/µL      Basophils Absolute 0.03 Thousands/µL             XR chest 1 view portable   ED Interpretation by Carissa Hill PA-C (02/19 0406)   No acute cardiopulmonary disease as interpreted by me at this time.          ECG 12 Lead Documentation Only    Date/Time: 2/19/2025 4:09 AM    Performed by: Carissa Hill PA-C  Authorized by: Carissa Hill PA-C    Indications / Diagnosis:  Tachycardia  ECG reviewed by me, the ED Provider: yes    Patient location:  ED  Previous ECG:     Previous ECG:  Compared to current    Comparison to cardiac monitor: No    Interpretation:     Interpretation: abnormal    Quality:     Tracing quality:  Limited by artifact  Rate:     ECG rate:  103    ECG rate assessment: tachycardic    Rhythm:     Rhythm: sinus rhythm    Ectopy:     Ectopy: none    QRS:     QRS axis:  Right    QRS intervals:  Normal  Conduction:     Conduction: normal    ST segments:     ST segments:  Normal  T waves:     T waves: non-specific        ED Medication and  Procedure Management   Prior to Admission Medications   Prescriptions Last Dose Informant Patient Reported? Taking?   acetaminophen (TYLENOL) 500 mg tablet   No No   Sig: Take 1 tablet (500 mg total) by mouth every 6 (six) hours as needed for moderate pain   acetaminophen (TYLENOL) 650 mg CR tablet   No No   Sig: Take 1 tablet (650 mg total) by mouth every 8 (eight) hours as needed for mild pain   Patient not taking: Reported on 10/4/2023   erythromycin (ILOTYCIN) ophthalmic ointment   No No   Sig: Place a 1/2 inch ribbon of ointment into the lower eyelid.   Patient not taking: Reported on 10/4/2023   ibuprofen (MOTRIN) 400 mg tablet   No No   Sig: Take 1.5 tablets (600 mg total) by mouth every 6 (six) hours as needed for mild pain   Patient not taking: Reported on 10/4/2023   ibuprofen (MOTRIN) 600 mg tablet   No No   Sig: Take 1 tablet (600 mg total) by mouth every 6 (six) hours as needed for moderate pain   meloxicam (Mobic) 15 mg tablet   No No   Sig: Take 1 tablet (15 mg total) by mouth daily      Facility-Administered Medications: None     Patient's Medications   Discharge Prescriptions    ONDANSETRON (ZOFRAN-ODT) 4 MG DISINTEGRATING TABLET    Take 1 tablet (4 mg total) by mouth every 6 (six) hours as needed for nausea or vomiting       Start Date: 2/19/2025 End Date: --       Order Dose: 4 mg       Quantity: 20 tablet    Refills: 0     No discharge procedures on file.  ED SEPSIS DOCUMENTATION   Time reflects when diagnosis was documented in both MDM as applicable and the Disposition within this note       Time User Action Codes Description Comment    2/19/2025  4:07 AM Carissa Hill [J11.1] Influenza     2/19/2025  4:08 AM Carissa Hill [R11.2] Nausea & vomiting                  Carissa Hill PA-C  02/19/25 0418

## 2025-02-19 NOTE — Clinical Note
Harshal Jhaveri was seen and treated in our emergency department on 2/19/2025.    No restrictions            Diagnosis: Hansel Caputo  may return to work on return date.    He may return on this date: 02/22/2025         If you have any questions or concerns, please don't hesitate to call.      Carissa Hill PA-C    ______________________________           _______________          _______________  Hospital Representative                              Date                                Time

## 2025-02-19 NOTE — DISCHARGE INSTRUCTIONS
[place] Tylenol o motrin si tiene fiebre. Úselo petey se indica en la caja.  Use medicamentos de venta kimberly para el resfriado y la gripe para la tos y la congestión. Recomiendo Zarbees.  Use Flonase o aerosol nasal de solución salina para la congestión nasal.  Trate de usar miel y agua tibia o té para el dolor de garganta y la tos.    Al un seguimiento con quiroz médico de ranjan si los síntomas no mejoran en los próximos días.

## 2025-03-18 ENCOUNTER — HOSPITAL ENCOUNTER (EMERGENCY)
Facility: HOSPITAL | Age: 20
Discharge: HOME | End: 2025-03-18
Attending: EMERGENCY MEDICINE | Admitting: EMERGENCY MEDICINE

## 2025-03-18 VITALS
DIASTOLIC BLOOD PRESSURE: 89 MMHG | RESPIRATION RATE: 18 BRPM | HEART RATE: 70 BPM | TEMPERATURE: 98.7 F | SYSTOLIC BLOOD PRESSURE: 125 MMHG | OXYGEN SATURATION: 99 %

## 2025-03-18 DIAGNOSIS — S43.401A SPRAIN OF RIGHT SHOULDER, UNSPECIFIED SHOULDER SPRAIN TYPE, INITIAL ENCOUNTER: Primary | ICD-10-CM

## 2025-03-18 PROCEDURE — 99283 EMERGENCY DEPT VISIT LOW MDM: CPT

## 2025-03-18 PROCEDURE — 99284 EMERGENCY DEPT VISIT MOD MDM: CPT | Performed by: EMERGENCY MEDICINE

## 2025-03-18 RX ORDER — IBUPROFEN 800 MG/1
800 TABLET, FILM COATED ORAL 3 TIMES DAILY
Qty: 21 TABLET | Refills: 0 | Status: SHIPPED | OUTPATIENT
Start: 2025-03-18

## 2025-03-18 RX ORDER — IBUPROFEN 400 MG/1
800 TABLET, FILM COATED ORAL ONCE
Status: COMPLETED | OUTPATIENT
Start: 2025-03-18 | End: 2025-03-18

## 2025-03-18 RX ADMIN — IBUPROFEN 800 MG: 400 TABLET, FILM COATED ORAL at 19:33

## 2025-03-18 NOTE — Clinical Note
Harshal Jhaveri was seen and treated in our emergency department on 3/18/2025.                Diagnosis:     Harshal  may return to work on return date.    He may return on this date: 03/21/2025         If you have any questions or concerns, please don't hesitate to call.      Soy Quarles MD    ______________________________           _______________          _______________  Hospital Representative                              Date                                Time

## 2025-03-19 NOTE — ED PROVIDER NOTES
Time reflects when diagnosis was documented in both MDM as applicable and the Disposition within this note       Time User Action Codes Description Comment    3/18/2025  7:19 PM Soy Quarles Add [S43.401A] Sprain of right shoulder, unspecified shoulder sprain type, initial encounter           ED Disposition       ED Disposition   Discharge    Condition   Stable    Date/Time   Tue Mar 18, 2025  7:19 PM    Comment   Harshal Jhaveri discharge to home/self care.                   Assessment & Plan       Medical Decision Making  Problems Addressed:  Sprain of right shoulder, unspecified shoulder sprain type, initial encounter: acute illness or injury     Details: Pain after swinging a bat,.  No direct trauma.  No clincial signs of dislocation.  Sling, conservative management.  Distally n/v intact.     Risk  Prescription drug management.             Medications   ibuprofen (MOTRIN) tablet 800 mg (800 mg Oral Given 3/18/25 1933)       ED Risk Strat Scores                            SBIRT 20yo+      Flowsheet Row Most Recent Value   Initial Alcohol Screen: US AUDIT-C     1. How often do you have a drink containing alcohol? 1 Filed at: 03/18/2025 1913   2. How many drinks containing alcohol do you have on a typical day you are drinking?  1 Filed at: 03/18/2025 1913   3a. Male UNDER 65: How often do you have five or more drinks on one occasion? 1 Filed at: 03/18/2025 1913   Audit-C Score 3 Filed at: 03/18/2025 1913   BRANDON: How many times in the past year have you...    Used an illegal drug or used a prescription medication for non-medical reasons? Never Filed at: 03/18/2025 1913                            History of Present Illness       Chief Complaint   Patient presents with    Shoulder Pain     An hour ago was playing baseball, felt a tear in R arm while swinging bat. Concerned about dislocation, no previous trauma to that arm.       History reviewed. No pertinent past medical history.   History reviewed. No  pertinent surgical history.   History reviewed. No pertinent family history.   Social History     Tobacco Use    Smoking status: Never    Smokeless tobacco: Never   Vaping Use    Vaping status: Every Day    Substances: Nicotine, Flavoring   Substance Use Topics    Alcohol use: Yes     Comment: 1, once every 2 months    Drug use: Never      E-Cigarette/Vaping    E-Cigarette Use Current Every Day User       E-Cigarette/Vaping Substances    Nicotine Yes     THC No     CBD No     Flavoring Yes     Other No     Unknown No       I have reviewed and agree with the history as documented.     Patient is a 20-year-old right hand dominant male presents with an acute onset of right shoulder pain.  Onset while swinging a bat playing baseball.  Denies any direct trauma. No meds.  No numbness/tingling.  Worse with any movement.          Review of Systems   Constitutional: Negative.    HENT: Negative.     Eyes: Negative.    Respiratory: Negative.     Cardiovascular: Negative.    Gastrointestinal: Negative.    Endocrine: Negative.    Genitourinary: Negative.    Musculoskeletal:  Positive for arthralgias and myalgias.   Skin: Negative.    Allergic/Immunologic: Negative.    Neurological: Negative.    Hematological: Negative.    Psychiatric/Behavioral: Negative.     All other systems reviewed and are negative.          Objective       ED Triage Vitals   Temperature Pulse Blood Pressure Respirations SpO2 Patient Position - Orthostatic VS   03/18/25 1912 03/18/25 1912 03/18/25 1912 03/18/25 1912 03/18/25 1912 03/18/25 1912   98.7 °F (37.1 °C) 70 125/89 18 99 % Sitting      Temp Source Heart Rate Source BP Location FiO2 (%) Pain Score    03/18/25 1912 03/18/25 1912 03/18/25 1912 -- 03/18/25 1933    Oral Monitor Left arm  4      Vitals      Date and Time Temp Pulse SpO2 Resp BP Pain Score FACES Pain Rating User   03/18/25 1933 -- -- -- -- -- 4 -- ST   03/18/25 1912 98.7 °F (37.1 °C) 70 99 % 18 125/89 -- -- ST            Physical  Exam  Vitals and nursing note reviewed.   Constitutional:       Appearance: Normal appearance. He is normal weight.   HENT:      Head: Normocephalic and atraumatic.   Cardiovascular:      Rate and Rhythm: Normal rate and regular rhythm.      Pulses: Normal pulses.      Heart sounds: Normal heart sounds.   Pulmonary:      Effort: Pulmonary effort is normal.      Breath sounds: Normal breath sounds.   Musculoskeletal:         General: Tenderness present. Normal range of motion.      Cervical back: Normal range of motion and neck supple.      Comments: +ttp laterl right delt.  No humeral deformity.  Able to place right hand behind back.  +supraspinatus test     No clavicular or elbow ttp   Skin:     General: Skin is warm and dry.      Capillary Refill: Capillary refill takes less than 2 seconds.      Findings: No bruising, lesion or rash.   Neurological:      General: No focal deficit present.      Mental Status: He is alert and oriented to person, place, and time.      Sensory: No sensory deficit.         Results Reviewed       None            No orders to display       Procedures    ED Medication and Procedure Management   Prior to Admission Medications   Prescriptions Last Dose Informant Patient Reported? Taking?   acetaminophen (TYLENOL) 500 mg tablet   No No   Sig: Take 1 tablet (500 mg total) by mouth every 6 (six) hours as needed for moderate pain   acetaminophen (TYLENOL) 650 mg CR tablet   No No   Sig: Take 1 tablet (650 mg total) by mouth every 8 (eight) hours as needed for mild pain   Patient not taking: Reported on 10/4/2023   erythromycin (ILOTYCIN) ophthalmic ointment   No No   Sig: Place a 1/2 inch ribbon of ointment into the lower eyelid.   Patient not taking: Reported on 10/4/2023   ibuprofen (MOTRIN) 400 mg tablet   No No   Sig: Take 1.5 tablets (600 mg total) by mouth every 6 (six) hours as needed for mild pain   Patient not taking: Reported on 10/4/2023   ibuprofen (MOTRIN) 600 mg tablet   No No    Sig: Take 1 tablet (600 mg total) by mouth every 6 (six) hours as needed for moderate pain   ondansetron (ZOFRAN-ODT) 4 mg disintegrating tablet   No No   Sig: Take 1 tablet (4 mg total) by mouth every 6 (six) hours as needed for nausea or vomiting      Facility-Administered Medications: None     Discharge Medication List as of 3/18/2025  7:22 PM        START taking these medications    Details   !! ibuprofen (MOTRIN) 800 mg tablet Take 1 tablet (800 mg total) by mouth 3 (three) times a day, Starting Tue 3/18/2025, Normal       !! - Potential duplicate medications found. Please discuss with provider.        CONTINUE these medications which have NOT CHANGED    Details   acetaminophen (TYLENOL) 500 mg tablet Take 1 tablet (500 mg total) by mouth every 6 (six) hours as needed for moderate pain, Starting Mon 11/25/2024, Normal      acetaminophen (TYLENOL) 650 mg CR tablet Take 1 tablet (650 mg total) by mouth every 8 (eight) hours as needed for mild pain, Starting Fri 9/29/2023, Normal      erythromycin (ILOTYCIN) ophthalmic ointment Place a 1/2 inch ribbon of ointment into the lower eyelid., Print      !! ibuprofen (MOTRIN) 400 mg tablet Take 1.5 tablets (600 mg total) by mouth every 6 (six) hours as needed for mild pain, Starting Fri 9/29/2023, Normal      !! ibuprofen (MOTRIN) 600 mg tablet Take 1 tablet (600 mg total) by mouth every 6 (six) hours as needed for moderate pain, Starting Mon 11/25/2024, Normal      ondansetron (ZOFRAN-ODT) 4 mg disintegrating tablet Take 1 tablet (4 mg total) by mouth every 6 (six) hours as needed for nausea or vomiting, Starting Wed 2/19/2025, Normal       !! - Potential duplicate medications found. Please discuss with provider.        No discharge procedures on file.  ED SEPSIS DOCUMENTATION   Time reflects when diagnosis was documented in both MDM as applicable and the Disposition within this note       Time User Action Codes Description Comment    3/18/2025  7:19 PM Soy Quarles  Add [S43.401A] Sprain of right shoulder, unspecified shoulder sprain type, initial encounter                  Soy Quarles MD  03/18/25 2040